# Patient Record
Sex: FEMALE | NOT HISPANIC OR LATINO | Employment: FULL TIME | URBAN - METROPOLITAN AREA
[De-identification: names, ages, dates, MRNs, and addresses within clinical notes are randomized per-mention and may not be internally consistent; named-entity substitution may affect disease eponyms.]

---

## 2021-04-08 ENCOUNTER — ALLIED HEALTH/NURSE VISIT (OUTPATIENT)
Dept: INTERPRETER SERVICES | Facility: CLINIC | Age: 46
End: 2021-04-08

## 2021-04-08 ENCOUNTER — HOSPITAL ENCOUNTER (EMERGENCY)
Facility: CLINIC | Age: 46
Discharge: HOME | End: 2021-04-08
Attending: EMERGENCY MEDICINE | Admitting: EMERGENCY MEDICINE

## 2021-04-08 ENCOUNTER — APPOINTMENT (OUTPATIENT)
Dept: ULTRASOUND IMAGING | Facility: CLINIC | Age: 46
End: 2021-04-08
Attending: EMERGENCY MEDICINE

## 2021-04-08 VITALS
TEMPERATURE: 98.2 F | DIASTOLIC BLOOD PRESSURE: 58 MMHG | HEART RATE: 71 BPM | SYSTOLIC BLOOD PRESSURE: 106 MMHG | RESPIRATION RATE: 16 BRPM | OXYGEN SATURATION: 100 %

## 2021-04-08 DIAGNOSIS — O99.891 MATERNAL BLOOD TRANSFUSION: ICD-10-CM

## 2021-04-08 DIAGNOSIS — R10.2 PERINEAL NEURALGIA: ICD-10-CM

## 2021-04-08 DIAGNOSIS — O26.91 COMPLICATION OF PREGNANCY IN FIRST TRIMESTER: ICD-10-CM

## 2021-04-08 DIAGNOSIS — Z3A.01 LESS THAN 8 WEEKS GESTATION OF PREGNANCY: ICD-10-CM

## 2021-04-08 LAB
ABO + RH BLD: NORMAL
ABO + RH BLD: NORMAL
ALBUMIN SERPL-MCNC: 4.6 G/DL (ref 3.4–5)
ALP SERPL-CCNC: 66 U/L (ref 40–150)
ALT SERPL W P-5'-P-CCNC: 28 U/L (ref 0–50)
ANION GAP SERPL CALCULATED.3IONS-SCNC: 7 MMOL/L (ref 3–14)
AST SERPL W P-5'-P-CCNC: 16 U/L (ref 0–45)
B-HCG SERPL-ACNC: 493 IU/L (ref 0–5)
BASOPHILS # BLD AUTO: 0.1 10E9/L (ref 0–0.2)
BASOPHILS NFR BLD AUTO: 0.6 %
BILIRUB SERPL-MCNC: <0.1 MG/DL (ref 0.2–1.3)
BLD GP AB SCN SERPL QL: NORMAL
BLOOD BANK CMNT PATIENT-IMP: NORMAL
BUN SERPL-MCNC: 19 MG/DL (ref 7–30)
CALCIUM SERPL-MCNC: 9.2 MG/DL (ref 8.5–10.1)
CHLORIDE SERPL-SCNC: 104 MMOL/L (ref 94–109)
CO2 SERPL-SCNC: 26 MMOL/L (ref 20–32)
CREAT SERPL-MCNC: 0.69 MG/DL (ref 0.52–1.04)
DIFFERENTIAL METHOD BLD: ABNORMAL
EOSINOPHIL # BLD AUTO: 0 10E9/L (ref 0–0.7)
EOSINOPHIL NFR BLD AUTO: 0.3 %
ERYTHROCYTE [DISTWIDTH] IN BLOOD BY AUTOMATED COUNT: 12.8 % (ref 10–15)
GFR SERPL CREATININE-BSD FRML MDRD: >90 ML/MIN/{1.73_M2}
GLUCOSE SERPL-MCNC: 113 MG/DL (ref 70–99)
HCT VFR BLD AUTO: 44 % (ref 35–47)
HGB BLD-MCNC: 14.3 G/DL (ref 11.7–15.7)
IMM GRANULOCYTES # BLD: 0 10E9/L (ref 0–0.4)
IMM GRANULOCYTES NFR BLD: 0.3 %
LYMPHOCYTES # BLD AUTO: 2.3 10E9/L (ref 0.8–5.3)
LYMPHOCYTES NFR BLD AUTO: 20.2 %
MCH RBC QN AUTO: 30.7 PG (ref 26.5–33)
MCHC RBC AUTO-ENTMCNC: 32.5 G/DL (ref 31.5–36.5)
MCV RBC AUTO: 94 FL (ref 78–100)
MONOCYTES # BLD AUTO: 0.6 10E9/L (ref 0–1.3)
MONOCYTES NFR BLD AUTO: 4.8 %
NEUTROPHILS # BLD AUTO: 8.6 10E9/L (ref 1.6–8.3)
NEUTROPHILS NFR BLD AUTO: 73.8 %
NRBC # BLD AUTO: 0 10*3/UL
NRBC BLD AUTO-RTO: 0 /100
PLATELET # BLD AUTO: 325 10E9/L (ref 150–450)
POTASSIUM SERPL-SCNC: 3.5 MMOL/L (ref 3.4–5.3)
PROT SERPL-MCNC: 8.9 G/DL (ref 6.8–8.8)
RBC # BLD AUTO: 4.66 10E12/L (ref 3.8–5.2)
SODIUM SERPL-SCNC: 137 MMOL/L (ref 133–144)
SPECIMEN EXP DATE BLD: NORMAL
WBC # BLD AUTO: 11.6 10E9/L (ref 4–11)

## 2021-04-08 PROCEDURE — 85025 COMPLETE CBC W/AUTO DIFF WBC: CPT | Performed by: EMERGENCY MEDICINE

## 2021-04-08 PROCEDURE — 99284 EMERGENCY DEPT VISIT MOD MDM: CPT | Performed by: EMERGENCY MEDICINE

## 2021-04-08 PROCEDURE — 76801 OB US < 14 WKS SINGLE FETUS: CPT

## 2021-04-08 PROCEDURE — T1013 SIGN LANG/ORAL INTERPRETER: HCPCS | Mod: U3,TEL

## 2021-04-08 PROCEDURE — 84702 CHORIONIC GONADOTROPIN TEST: CPT | Performed by: EMERGENCY MEDICINE

## 2021-04-08 PROCEDURE — 86850 RBC ANTIBODY SCREEN: CPT | Performed by: EMERGENCY MEDICINE

## 2021-04-08 PROCEDURE — 80053 COMPREHEN METABOLIC PANEL: CPT | Performed by: EMERGENCY MEDICINE

## 2021-04-08 PROCEDURE — 86900 BLOOD TYPING SEROLOGIC ABO: CPT | Performed by: EMERGENCY MEDICINE

## 2021-04-08 PROCEDURE — 86901 BLOOD TYPING SEROLOGIC RH(D): CPT | Performed by: EMERGENCY MEDICINE

## 2021-04-08 PROCEDURE — 99207 PR NO BILLABLE SERVICE THIS VISIT: CPT | Performed by: OBSTETRICS & GYNECOLOGY

## 2021-04-08 PROCEDURE — 99284 EMERGENCY DEPT VISIT MOD MDM: CPT | Mod: 25

## 2021-04-08 NOTE — ED TRIAGE NOTES
Patient sent here from fertility clinic with possible ectopic pregnancy. Sent here for ultra sound.

## 2021-04-08 NOTE — CONSULTS
Gynecology Consult Note    Referring Physician: Dr. Mat Peraza  Reason for Consult: rule out ectopic    HPI: Venita Bojorquez is a 45 year old  at 5w4d per [facility] records. Pt reports IUI on 3/14. She then had vaginal bleeding on 3/31 which lasted a few days and patient assumed she was no longer pregnant. She is following at [facility] for fertility management. She was started on estrace and prometrium PV and reports bleeding resolved. She has had serial labs and ultrasounds.  Denies sexual activity since IUI.     - HCG 54.1   - pelvic US (presumed TV) 1.53 cm left ovarian cyst   - .1   - .9  Per pt US today was concerning for ectopic pregnancy. This ultrasound report is not included in her records.    Pt reports mild twinge of abdominal pain when waiting in triage. She had a little bit of right sided abdominal pain yesterday. No abdominal pain currently. Reports no further vaginal bleeding or discharge for a few days.      OBHx:   - history of 7 week SAB per pt      PMH: Denies  PSH: Denies    Social Hx: Denies tobacco, alcohol or drug use.    ROS:   Negative except per HPI    Objective:   /60   Pulse 84   Temp 98.5  F (36.9  C) (Oral)   Resp 16   SpO2 100%   Constitutional: Healthy appearing female, no acute distress  HEENT: Normal appearance.    Cardiovascular: Regular rate  Respiratory: Unlabored breathing  Gastrointestinal: Abdomen soft, non-tender. No masses, organomegaly  Pelvic Exam - : External genitalia normal well-estrogenized, healthy tissue.  No obvious excoriations, lesions, or rashes.   Bimanual: No CMT, small uterus. No adnexal masses. Slight left adnexal tenderness on bimanual exam.   Skin: No suspicious lesions or rashes  Psychiatric: mentation appears normal and affect normal/bright    Labs/Imaging:  Results for orders placed or performed during the hospital encounter of 21   CBC with platelets differential     Status: Abnormal   Result  Value Ref Range    WBC 11.6 (H) 4.0 - 11.0 10e9/L    RBC Count 4.66 3.8 - 5.2 10e12/L    Hemoglobin 14.3 11.7 - 15.7 g/dL    Hematocrit 44.0 35.0 - 47.0 %    MCV 94 78 - 100 fl    MCH 30.7 26.5 - 33.0 pg    MCHC 32.5 31.5 - 36.5 g/dL    RDW 12.8 10.0 - 15.0 %    Platelet Count 325 150 - 450 10e9/L    Diff Method Automated Method     % Neutrophils 73.8 %    % Lymphocytes 20.2 %    % Monocytes 4.8 %    % Eosinophils 0.3 %    % Basophils 0.6 %    % Immature Granulocytes 0.3 %    Nucleated RBCs 0 0 /100    Absolute Neutrophil 8.6 (H) 1.6 - 8.3 10e9/L    Absolute Lymphocytes 2.3 0.8 - 5.3 10e9/L    Absolute Monocytes 0.6 0.0 - 1.3 10e9/L    Absolute Eosinophils 0.0 0.0 - 0.7 10e9/L    Absolute Basophils 0.1 0.0 - 0.2 10e9/L    Abs Immature Granulocytes 0.0 0 - 0.4 10e9/L    Absolute Nucleated RBC 0.0    Comprehensive metabolic panel     Status: Abnormal   Result Value Ref Range    Sodium 137 133 - 144 mmol/L    Potassium 3.5 3.4 - 5.3 mmol/L    Chloride 104 94 - 109 mmol/L    Carbon Dioxide 26 20 - 32 mmol/L    Anion Gap 7 3 - 14 mmol/L    Glucose 113 (H) 70 - 99 mg/dL    Urea Nitrogen 19 7 - 30 mg/dL    Creatinine 0.69 0.52 - 1.04 mg/dL    GFR Estimate >90 >60 mL/min/[1.73_m2]    GFR Estimate If Black >90 >60 mL/min/[1.73_m2]    Calcium 9.2 8.5 - 10.1 mg/dL    Bilirubin Total <0.1 (L) 0.2 - 1.3 mg/dL    Albumin 4.6 3.4 - 5.0 g/dL    Protein Total 8.9 (H) 6.8 - 8.8 g/dL    Alkaline Phosphatase 66 40 - 150 U/L    ALT 28 0 - 50 U/L    AST 16 0 - 45 U/L   HCG quantitative pregnancy     Status: Abnormal   Result Value Ref Range    HCG Quantitative Serum 493 (H) 0 - 5 IU/L   ABO/Rh type and screen     Status: None   Result Value Ref Range    ABO B     RH(D) Pos     Antibody Screen Neg     Test Valid Only At          [facility]    Specimen Expires 2021         Assessment/Plan:   Venita Bojorquez is a 45 year old  at 5w4d by LMP (underwent IUI on 3/14) here with  concern for ectopic pregnancy at [facility].    Rule out ectopic pregnancy  - Rh pos, no rhogam indicated  - hemodynamically stable, no concern for ruptured ectopic pregnancy at this point in time  - based on serial HCG, rise is appropriate  - awaiting pelvic US. Reviewed possible options. If concern for ruptured or large ectopic could need laparoscopic surgery (very unlikely). Discussed if we are very certain this is an ectopic pregnancy would likely be able to treat medically with methotrexate and close follow-up.  Discussed if there remains doubts regarding ectopic pregnancy and given this is a highly desired pregnancy we may watch closely with blood work every 2 days and to return if further bleeding or pain.  -Pt signed out to Ob/Gyn night team who will await US results and discuss ultimate plan with patient.    Amy Schumer, MD  Ob/Gyn PGY-4  5:45 PM 4/8/2021    Addendum:  Ultrasound with no evidence of ectopic, patient seen again with video [place] Islander-speaking  to discuss findings and plan. Discussed two small fibroids, 1.5 and 3cm, that we would not expect to cause pain or other problems during the pregnancy. Discussed at length that the fibroids are not dangerous to her or her pregnancy and that we would not treat them during pregnancy. She can pursue treatment for the fibroids after pregnancy if symptomatic. Advised that she is too early in gestation for us to see a pregnancy definitively on ultrasound, recommend trending HCG and follow-up ultrasound to locate pregnancy once level appropriate. Recommend follow-up in 48 hours for timed blood draw, OB/Gyn team on call at that time will review results and contact patient with plan. Patient expressed understanding and was able to independently repeat a summary of our discussion at the end of the conversation.    Patient and care plan discussed with Dr. Maxwell.    Donna Brothers MD  Ob/Gyn Resident, PGY-3  04/09/21 4:07 AM     Staff MD Note    I appreciate  the note by Dr. Brothers.  Any necessary changes have been made by me.  I discussed the patient with the resident and agree with the findings and plan of care as documented in the note.    Kylie Maxwell MD

## 2021-04-08 NOTE — ED PROVIDER NOTES
[facility] EMERGENCY DEPARTMENT ([facility])     April 8, 2021  History     Chief Complaint   Patient presents with     Pregnancy Complications     possible ectopic pregnancy, sent from clinic to get ultrasound     HPI  Venita Bojorquez is a (no history found) 45 year old female who presents to the ED today with concern for pregnancy complications.  Patient states she was seen in clinic earlier today where an ultrasound was performed and she was told she had a possible ectopic pregnancy.  Patient had IUI on 3/14.  Had an episode of vaginal bleeding on 3/31 but has had positive pregnancy test since that time.  Patient has had bilateral pelvic cramping somewhat worse on the right for the past several days.  This is been noted to be minor in nature.  No vaginal bleeding.  Patient was in clinic today and there was concern for ectopic pregnancy on ultrasound so she was sent to this facility for further evaluation.  No other symptoms noted.    I have reviewed the Medications, Allergies, Past Medical and Surgical History, and Social History in the Epic system.  PAST MEDICAL HISTORY: No past medical history on file.    PAST SURGICAL HISTORY: No past surgical history on file.    Past medical history, past surgical history, medications, and allergies were reviewed with the patient. Additional pertinent items: None    FAMILY HISTORY: No family history on file.    SOCIAL HISTORY:   Social History     Tobacco Use     Smoking status: Not on file   Substance Use Topics     Alcohol use: Not on file     Social history was reviewed with the patient. Additional pertinent items: None      Patient's Medications    No medications on file        No Known Allergies     Review of Systems  A complete review of systems was performed with pertinent positives and negatives noted in the HPI, and all other systems negative.    Physical Exam   BP: 121/60  Pulse: 84  Resp: 16  SpO2: 100 %      Physical Exam  Constitutional:        General: She is not in acute distress.     Appearance: She is well-developed. She is not diaphoretic.   HENT:      Head: Normocephalic and atraumatic.      Mouth/Throat:      Pharynx: No oropharyngeal exudate.   Eyes:      General: No scleral icterus.        Right eye: No discharge.         Left eye: No discharge.      Pupils: Pupils are equal, round, and reactive to light.   Neck:      Musculoskeletal: Normal range of motion and neck supple.   Cardiovascular:      Rate and Rhythm: Normal rate and regular rhythm.      Heart sounds: Normal heart sounds. No murmur. No friction rub. No gallop.    Pulmonary:      Effort: Pulmonary effort is normal. No respiratory distress.      Breath sounds: Normal breath sounds. No wheezing.   Chest:      Chest wall: No tenderness.   Abdominal:      General: Bowel sounds are normal. There is no distension.      Palpations: Abdomen is soft.      Tenderness: There is no abdominal tenderness.   Musculoskeletal: Normal range of motion.         General: No tenderness or deformity.   Skin:     General: Skin is warm and dry.      Coloration: Skin is not pale.      Findings: No erythema or rash.   Neurological:      Mental Status: She is alert and oriented to person, place, and time.      Cranial Nerves: No cranial nerve deficit.   Psychiatric:         Mood and Affect: Affect is tearful.         ED Course   4:32 PM  The patient was seen and examined by Mat Peraza DO in Room ED05.          Procedures                           No results found for this or any previous visit (from the past 24 hour(s)).  Medications - No data to display          Assessments & Plan (with Medical Decision Making)   This is a 45-year-old female who was sent from [facility] with concern for ectopic pregnancy.  An ultrasound as an outpatient today which was concerning for ectopic pregnancy.  Patient had an episode of vaginal bleeding 1 week ago but is not currently having the symptoms.  Abdomen is soft and nontender.   Patient was seen by OB and a second ultrasound was obtained.  Work shows a quant hCG of 493.  Ultrasound here shows no ectopic pregnancy, there is also no intrauterine pregnancy.  After discussion with OB plan is to have patient follow-up on 4/10 for repeat hCG.  Patient was also instructed to return if she develops any symptoms.  She was discharged home with return precautions.    I have reviewed the nursing notes.    I have reviewed the findings, diagnosis, plan and need for follow up with the patient.    New Prescriptions    No medications on file       Final diagnoses:   None   IReyes am serving as a trained medical scribe to document services personally performed by Mat Peraza DO, based on the provider's statements to me.     Mat PAUL DO, was physically present and have reviewed and verified the accuracy of this note documented by Reyes Chamorro.        4/8/2021   [facility] EMERGENCY DEPARTMENT     Mat Peraza DO  04/09/21 0011

## 2021-04-09 NOTE — ED NOTES
Pt was given discharge paperwork and understands follow up appointment on Saturday.  used. Vital signs stable.

## 2021-04-09 NOTE — DISCHARGE INSTRUCTIONS
Have your blood drawn at [facility] at 4:00 on Saturday. Continue with scheduled follow-up with OB. Return to the Emergency Department if you have worsening pain, bleeding, any new symptoms or cause for concern.

## 2021-04-10 ENCOUNTER — HOSPITAL ENCOUNTER (EMERGENCY)
Facility: CLINIC | Age: 46
Discharge: HOME | End: 2021-04-10
Attending: EMERGENCY MEDICINE | Admitting: EMERGENCY MEDICINE

## 2021-04-10 VITALS
WEIGHT: 143 LBS | RESPIRATION RATE: 14 BRPM | HEART RATE: 75 BPM | TEMPERATURE: 98 F | DIASTOLIC BLOOD PRESSURE: 72 MMHG | OXYGEN SATURATION: 99 % | SYSTOLIC BLOOD PRESSURE: 107 MMHG

## 2021-04-10 DIAGNOSIS — O03.9 SPONTANEOUS MISCARRIAGE: ICD-10-CM

## 2021-04-10 LAB — B-HCG SERPL-ACNC: 276 IU/L (ref 0–5)

## 2021-04-10 PROCEDURE — 99284 EMERGENCY DEPT VISIT MOD MDM: CPT | Performed by: EMERGENCY MEDICINE

## 2021-04-10 PROCEDURE — 84702 CHORIONIC GONADOTROPIN TEST: CPT | Performed by: EMERGENCY MEDICINE

## 2021-04-10 PROCEDURE — 250N000013 HC RX MED GY IP 250 OP 250 PS 637: Performed by: EMERGENCY MEDICINE

## 2021-04-10 PROCEDURE — 99283 EMERGENCY DEPT VISIT LOW MDM: CPT | Performed by: EMERGENCY MEDICINE

## 2021-04-10 RX ORDER — ESTRADIOL 2 MG/1
2 TABLET ORAL DAILY
COMMUNITY

## 2021-04-10 RX ORDER — IBUPROFEN 600 MG/1
600 TABLET, FILM COATED ORAL EVERY 6 HOURS PRN
Qty: 20 TABLET | Refills: 0 | Status: SHIPPED | OUTPATIENT
Start: 2021-04-10

## 2021-04-10 RX ORDER — MISOPROSTOL 200 UG/1
600 TABLET ORAL ONCE
Status: COMPLETED | OUTPATIENT
Start: 2021-04-10 | End: 2021-04-10

## 2021-04-10 RX ADMIN — MISOPROSTOL 600 MCG: 200 TABLET ORAL at 13:35

## 2021-04-10 ASSESSMENT — ENCOUNTER SYMPTOMS
HEADACHES: 0
DIFFICULTY URINATING: 0
ABDOMINAL PAIN: 1
CONFUSION: 0
SHORTNESS OF BREATH: 0
NECK STIFFNESS: 0
EYE REDNESS: 0
FEVER: 0
ARTHRALGIAS: 0
COLOR CHANGE: 0

## 2021-04-10 NOTE — ED PROVIDER NOTES
ED Provider Note  [facility]      History     Chief Complaint   Patient presents with     Vaginal Bleeding     yESTERDAY , SHE STARTED HAVING ABDOMINAL pain with staining on her pad.  When  she goes to toilet, more blood is seen on paper and in toilet     HPI    The history is provided by the patient and medical records.      Venita Bojorquez is a 45 year old female with no known past medical history who presents to the ED for evaluation of vaginal bleeding.  On 4/8/2021, the patient presented to the ED after going to a clinic earlier that day where an ultrasound was performed and she was told that she had a possible ectopic pregnancy.  The patient had IUD placement on 3/14/2021.  Patient had episode of vaginal bleeding on 3/31 and had a positive pregnancy test at that time.  Patient was seen by OB and a second ultrasound was obtained in addition to having an hCG of 493.  The ED ultrasound showed no ectopic pregnancy and there was no intrauterine pregnancy.  After discussion with OB, they were discharged with a plan to follow-up on 4/10/2021 for a repeat hCG.     Today, the patient was scheduled for an appointment at 4 PM but was told by doctor that if she is experiencing the symptoms that she should present to the ED for evaluation.  Yesterday, the patient is experiencing abdominal pain throughout her abdomen in addition to an onset of vaginal bleeding that started between 12-2 PM.  The patient reports soaking through 1 pad and then changing it this morning and now has mild amount of blood on her pad again.  Patient has not had any abdominal pain during her last ED visit on 4/8/2021, her pain start yesterday and resolved throughout the night.  The patient denies any dysuria, hematuria, urinary frequency or urinary urgency.     Past Medical History  Past Medical History   No past medical history on file.     Past Surgical History   No past surgical history on file.     estradiol  (ESTRACE) 2 MG tablet  Prenatal Vit-Fe Fumarate-FA (PRENATAL MULTIVITAMIN  PLUS IRON) 27-1 MG TABS  progesterone (PROMETRIUM) 200 MG capsule        No Known Allergies  Family History  Family History   No family history on file.     Social History   Social History           Tobacco Use     Smoking status: Not on file   Substance Use Topics     Alcohol use: Not on file     Drug use: Not on file       Past Medical History  No past medical history on file.  No past surgical history on file.  estradiol (ESTRACE) 2 MG tablet  ibuprofen (ADVIL/MOTRIN) 600 MG tablet  Prenatal Vit-Fe Fumarate-FA (PRENATAL MULTIVITAMIN  PLUS IRON) 27-1 MG TABS  progesterone (PROMETRIUM) 200 MG capsule      No Known Allergies  Family History  No family history on file.  Social History   Social History     Tobacco Use     Smoking status: Not on file   Substance Use Topics     Alcohol use: Not on file     Drug use: Not on file      Past medical history, past surgical history, medications, allergies, family history, and social history were reviewed with the patient. No additional pertinent items.       Review of Systems   Constitutional: Negative for fever.   HENT: Negative for congestion.    Eyes: Negative for redness.   Respiratory: Negative for shortness of breath.    Cardiovascular: Negative for chest pain.   Gastrointestinal: Positive for abdominal pain.   Genitourinary: Positive for vaginal bleeding. Negative for difficulty urinating.   Musculoskeletal: Negative for arthralgias and neck stiffness.   Skin: Negative for color change.   Neurological: Negative for headaches.   Psychiatric/Behavioral: Negative for confusion.     A complete review of systems was performed with pertinent positives and negatives noted in the HPI, and all other systems negative.    Physical Exam   BP: 107/71  Pulse: 88  Temp: 98.5  F (36.9  C)  Resp: 20  Weight: 64.9 kg (143 lb)  SpO2: 99 %  Physical Exam  Constitutional:       General: She is not in acute  distress.     Appearance: She is not diaphoretic.   HENT:      Head: Atraumatic.      Mouth/Throat:      Pharynx: No oropharyngeal exudate.   Eyes:      General: No scleral icterus.     Pupils: Pupils are equal, round, and reactive to light.   Cardiovascular:      Heart sounds: Normal heart sounds.   Pulmonary:      Effort: No respiratory distress.      Breath sounds: Normal breath sounds.   Abdominal:      General: Bowel sounds are normal.      Palpations: Abdomen is soft.      Tenderness: There is no abdominal tenderness.   Musculoskeletal:         General: No tenderness.   Skin:     General: Skin is warm.      Findings: No rash.         ED Course      Procedures        The medical record was reviewed and interpreted.  Current labs reviewed and interpreted.  Previous labs reviewed and interpreted.       Results for orders placed or performed during the hospital encounter of 04/10/21   HCG quantitative pregnancy (blood)     Status: Abnormal   Result Value Ref Range    HCG Quantitative Serum 276 (H) 0 - 5 IU/L     Medications   misoprostol (CYTOTEC) tablet 600 mcg (600 mcg Oral Given 4/10/21 1335)        Assessments & Plan (with Medical Decision Making)   45-year-old female presents for evaluation of vaginal bleeding and lower abdominal cramping yesterday.  Differential included ectopic, spontaneous miscarriage, menses.  Exam revealed normal vital signs with benign abdomen.  Beta-hCG was 276 down from value 2 days earlier pointing to a spontaneous miscarriage.  The case was discussed at length with OB/gyne who recommended misoprostol versus expectant management.  Patient elects the former and she was given 600 mcg of misoprostol in the emergency room.  Explained that this would expedite miscarriage and possible side effects.  I have recommended follow-up with OB/gyne in 1 week.    I have reviewed the nursing notes. I have reviewed the findings, diagnosis, plan and need for follow up with the patient.    Discharge  Medication List as of 4/10/2021  1:56 PM          Final diagnoses:   Spontaneous miscarriage       --  Dr. Randall Bell MD  [facility] EMERGENCY DEPARTMENT  4/10/2021     Randall Bell MD  04/10/21 1550

## 2021-04-10 NOTE — ED NOTES
Pt appeared sad and anxious about the pregnancy. Tearful and upset. Pt denies pain, but reported light bleeding.

## 2021-04-22 ENCOUNTER — APPOINTMENT (OUTPATIENT)
Dept: INTERPRETER SERVICES | Facility: CLINIC | Age: 46
End: 2021-04-22

## 2021-06-22 ENCOUNTER — APPOINTMENT (OUTPATIENT)
Dept: INTERPRETER SERVICES | Facility: CLINIC | Age: 46
End: 2021-06-22